# Patient Record
Sex: FEMALE | Race: OTHER | HISPANIC OR LATINO | URBAN - METROPOLITAN AREA
[De-identification: names, ages, dates, MRNs, and addresses within clinical notes are randomized per-mention and may not be internally consistent; named-entity substitution may affect disease eponyms.]

---

## 2018-09-25 ENCOUNTER — HOSPITAL ENCOUNTER (EMERGENCY)
Facility: HOSPITAL | Age: 28
Discharge: HOME | End: 2018-09-25
Attending: EMERGENCY MEDICINE | Admitting: EMERGENCY MEDICINE

## 2018-09-25 VITALS
DIASTOLIC BLOOD PRESSURE: 55 MMHG | WEIGHT: 144 LBS | OXYGEN SATURATION: 98 % | BODY MASS INDEX: 30.23 KG/M2 | TEMPERATURE: 99.2 F | RESPIRATION RATE: 17 BRPM | HEIGHT: 58 IN | SYSTOLIC BLOOD PRESSURE: 97 MMHG | HEART RATE: 69 BPM

## 2018-09-25 DIAGNOSIS — N39.0 UTI (URINARY TRACT INFECTION): ICD-10-CM

## 2018-09-25 DIAGNOSIS — J02.9 PHARYNGITIS: Primary | ICD-10-CM

## 2018-09-25 LAB
ALBUMIN SERPL BCP-MCNC: 4.1 G/DL (ref 3–5.2)
ALP SERPL-CCNC: 59 U/L (ref 43–122)
ALT SERPL W P-5'-P-CCNC: 31 U/L (ref 9–52)
ANION GAP SERPL CALCULATED.3IONS-SCNC: 9 MMOL/L (ref 5–14)
AST SERPL W P-5'-P-CCNC: 30 U/L (ref 14–36)
BACTERIA UR QL AUTO: ABNORMAL /HPF
BASOPHILS # BLD AUTO: 0 THOUSANDS/ΜL (ref 0–0.1)
BASOPHILS NFR BLD AUTO: 0 % (ref 0–1)
BILIRUB SERPL-MCNC: 0.6 MG/DL
BILIRUB UR QL STRIP: NEGATIVE
BUN SERPL-MCNC: 11 MG/DL (ref 5–25)
CALCIUM SERPL-MCNC: 8.8 MG/DL (ref 8.4–10.2)
CHLORIDE SERPL-SCNC: 106 MMOL/L (ref 97–108)
CK MB SERPL-MCNC: 0.8 NG/ML (ref 0–2.4)
CK MB SERPL-MCNC: <1 % (ref 0–2.5)
CK SERPL-CCNC: 199 U/L (ref 30–135)
CLARITY UR: ABNORMAL
CO2 SERPL-SCNC: 24 MMOL/L (ref 22–30)
COLOR UR: YELLOW
CREAT SERPL-MCNC: 0.58 MG/DL (ref 0.6–1.2)
EOSINOPHIL # BLD AUTO: 0.4 THOUSAND/ΜL (ref 0–0.4)
EOSINOPHIL NFR BLD AUTO: 4 % (ref 0–6)
ERYTHROCYTE [DISTWIDTH] IN BLOOD BY AUTOMATED COUNT: 14.3 %
EXT PREG TEST URINE: NORMAL
GFR SERPL CREATININE-BSD FRML MDRD: 126 ML/MIN/1.73SQ M
GLUCOSE SERPL-MCNC: 95 MG/DL (ref 70–99)
GLUCOSE UR STRIP-MCNC: NEGATIVE MG/DL
HCT VFR BLD AUTO: 37.6 % (ref 36–46)
HGB BLD-MCNC: 12.4 G/DL (ref 12–16)
HGB UR QL STRIP.AUTO: NEGATIVE
KETONES UR STRIP-MCNC: NEGATIVE MG/DL
LEUKOCYTE ESTERASE UR QL STRIP: 500
LIPASE SERPL-CCNC: 141 U/L (ref 23–300)
LYMPHOCYTES # BLD AUTO: 3.5 THOUSANDS/ΜL (ref 0.5–4)
LYMPHOCYTES NFR BLD AUTO: 36 % (ref 20–50)
MCH RBC QN AUTO: 29.6 PG (ref 26–34)
MCHC RBC AUTO-ENTMCNC: 33.1 G/DL (ref 31–36)
MCV RBC AUTO: 89 FL (ref 80–100)
MONOCYTES # BLD AUTO: 0.7 THOUSAND/ΜL (ref 0.2–0.9)
MONOCYTES NFR BLD AUTO: 8 % (ref 1–10)
NEUTROPHILS # BLD AUTO: 4.9 THOUSANDS/ΜL (ref 1.8–7.8)
NEUTS SEG NFR BLD AUTO: 52 % (ref 45–65)
NITRITE UR QL STRIP: NEGATIVE
NON-SQ EPI CELLS URNS QL MICRO: ABNORMAL /HPF
PH UR STRIP.AUTO: 7 [PH] (ref 4.5–8)
PLATELET # BLD AUTO: 301 THOUSANDS/UL (ref 150–450)
PMV BLD AUTO: 8.7 FL (ref 8.9–12.7)
POTASSIUM SERPL-SCNC: 3.7 MMOL/L (ref 3.6–5)
PROT SERPL-MCNC: 7.3 G/DL (ref 5.9–8.4)
PROT UR STRIP-MCNC: NEGATIVE MG/DL
RBC # BLD AUTO: 4.21 MILLION/UL (ref 4–5.2)
RBC #/AREA URNS AUTO: ABNORMAL /HPF
S PYO AG THROAT QL: NEGATIVE
SODIUM SERPL-SCNC: 139 MMOL/L (ref 137–147)
SP GR UR STRIP.AUTO: 1.01 (ref 1–1.04)
UROBILINOGEN UA: NEGATIVE MG/DL
WBC # BLD AUTO: 9.6 THOUSAND/UL (ref 4.5–11)
WBC #/AREA URNS AUTO: ABNORMAL /HPF

## 2018-09-25 PROCEDURE — 80053 COMPREHEN METABOLIC PANEL: CPT | Performed by: PHYSICIAN ASSISTANT

## 2018-09-25 PROCEDURE — 81003 URINALYSIS AUTO W/O SCOPE: CPT | Performed by: PHYSICIAN ASSISTANT

## 2018-09-25 PROCEDURE — 81025 URINE PREGNANCY TEST: CPT | Performed by: PHYSICIAN ASSISTANT

## 2018-09-25 PROCEDURE — 36415 COLL VENOUS BLD VENIPUNCTURE: CPT | Performed by: PHYSICIAN ASSISTANT

## 2018-09-25 PROCEDURE — 99283 EMERGENCY DEPT VISIT LOW MDM: CPT

## 2018-09-25 PROCEDURE — 96374 THER/PROPH/DIAG INJ IV PUSH: CPT

## 2018-09-25 PROCEDURE — 85025 COMPLETE CBC W/AUTO DIFF WBC: CPT | Performed by: PHYSICIAN ASSISTANT

## 2018-09-25 PROCEDURE — 81001 URINALYSIS AUTO W/SCOPE: CPT | Performed by: PHYSICIAN ASSISTANT

## 2018-09-25 PROCEDURE — 83690 ASSAY OF LIPASE: CPT | Performed by: PHYSICIAN ASSISTANT

## 2018-09-25 PROCEDURE — 87086 URINE CULTURE/COLONY COUNT: CPT | Performed by: PHYSICIAN ASSISTANT

## 2018-09-25 PROCEDURE — 82553 CREATINE MB FRACTION: CPT | Performed by: PHYSICIAN ASSISTANT

## 2018-09-25 PROCEDURE — 82550 ASSAY OF CK (CPK): CPT | Performed by: PHYSICIAN ASSISTANT

## 2018-09-25 PROCEDURE — 87070 CULTURE OTHR SPECIMN AEROBIC: CPT | Performed by: PHYSICIAN ASSISTANT

## 2018-09-25 PROCEDURE — 87430 STREP A AG IA: CPT | Performed by: PHYSICIAN ASSISTANT

## 2018-09-25 RX ORDER — ONDANSETRON 2 MG/ML
4 INJECTION INTRAMUSCULAR; INTRAVENOUS ONCE
Status: COMPLETED | OUTPATIENT
Start: 2018-09-25 | End: 2018-09-25

## 2018-09-25 RX ORDER — ONDANSETRON 2 MG/ML
INJECTION INTRAMUSCULAR; INTRAVENOUS
Status: COMPLETED
Start: 2018-09-25 | End: 2018-09-25

## 2018-09-25 RX ORDER — SODIUM CHLORIDE 9 MG/ML
250 INJECTION, SOLUTION INTRAVENOUS CONTINUOUS
Status: DISCONTINUED | OUTPATIENT
Start: 2018-09-25 | End: 2018-09-25 | Stop reason: HOSPADM

## 2018-09-25 RX ORDER — ACETAMINOPHEN 325 MG/1
650 TABLET ORAL ONCE
Status: COMPLETED | OUTPATIENT
Start: 2018-09-25 | End: 2018-09-25

## 2018-09-25 RX ORDER — CEPHALEXIN 500 MG/1
500 CAPSULE ORAL EVERY 8 HOURS SCHEDULED
Qty: 30 CAPSULE | Refills: 0 | Status: SHIPPED | OUTPATIENT
Start: 2018-09-25 | End: 2018-10-05

## 2018-09-25 RX ORDER — ACETAMINOPHEN 325 MG/1
TABLET ORAL
Status: COMPLETED
Start: 2018-09-25 | End: 2018-09-25

## 2018-09-25 RX ORDER — IBUPROFEN 600 MG/1
600 TABLET ORAL EVERY 6 HOURS PRN
Qty: 30 TABLET | Refills: 0 | Status: SHIPPED | OUTPATIENT
Start: 2018-09-25

## 2018-09-25 RX ADMIN — SODIUM CHLORIDE 250 ML/HR: 9 INJECTION, SOLUTION INTRAVENOUS at 18:17

## 2018-09-25 RX ADMIN — ONDANSETRON 4 MG: 2 INJECTION INTRAMUSCULAR; INTRAVENOUS at 17:42

## 2018-09-25 RX ADMIN — ONDANSETRON 4 MG: 2 INJECTION, SOLUTION INTRAMUSCULAR; INTRAVENOUS at 17:42

## 2018-09-25 RX ADMIN — ACETAMINOPHEN 650 MG: 325 TABLET ORAL at 17:41

## 2018-09-25 NOTE — DISCHARGE INSTRUCTIONS
Faringitis   LO QUE NECESITA SABER:   La faringitis o dolor de garganta es la inflamación de los tejidos y estructuras en galvin faringe (garganta)  La faringitis es generalmente causada por octavia bacteria  También podría ser causada por un resfriado o el virus de la gripe  Otras causas incluyen el fumar, las alergias o el reflujo estomacal    [facility]STRUCCIONES SOBRE EL [facility]:   Llame al 911 en ag de presentar lo siguiente:   · Usted tiene dificultad para respirar o tragar porque galvin garganta está inflamada o adolorida  Regrese a la elise de emergencias si:   · Usted está babeando porque le duele demasiado tragar  · Usted tiene fiebre por encima de 102? F (39?C) o le dura más de 3 días  · Usted está confundido  · Usted siente sabor a sheryl en galvin garganta  Pregúntele a galvin Eugenia Rumps vitaminas y minerales son adecuados para usted  · Galvin dolor de garganta empeora  · Usted tiene un bulto adolorido en galvin garganta que no se darrion después de 5 días  · Nohemi síntomas no mejoran después de 5 días  · Usted tiene preguntas o inquietudes acerca de galvin condición o cuidado  Medicamentos:  La faringitis viral desaparecerá por sí kristie sin necesidad de tratamiento  Galvin dolor de garganta empezará a mejorar dentro de 3 a 5 días en ambos casos de infecciones virales o bacteriales  Es posible que usted necesite alguno de los siguientes:  · Antibióticos  tratan las infecciones bacteriales  · AINEs (Analgésicos antiinflamatorios no esteroides) manny el ibuprofeno, ayudan a disminuir la inflamación, el dolor y la [place]  Los AINEs pueden causar sangrado estomacal o problemas renales en ciertas personas  Si usted jasmeet un medicamento anticoagulante, siempre pregúntele a galvin médico si los PETE son seguros para usted  Siempre tonya la etiqueta de blas medicamento y [place] instrucciones  · El acetaminofén  [place]iss[place] el dolor y baja la fiebre  Está disponible sin receta médica   Pregunte la cantidad y la frecuencia con que debe tomarlos  [address]  El acetaminofén puede causar daño en el hígado cuando no se jasmeet de forma correcta  · [place] john medicamentos manny se le haya indicado  Consulte con galvin médico si usted missael que galvin medicamento no le está ayudando o si presenta efectos secundarios  Infórmele si es alérgico a cualquier medicamento  Mantenga octavia lista actualizada de los [employer], las vitaminas y los productos herbales que jasmeet  Incluya los siguientes datos de los medicamentos: cantidad, frecuencia y motivo de administración  Traiga con usted la lista o los envases de la píldoras a john citas de seguimiento  Lleve la lista de los medicamentos con usted en ag de octavia emergencia  [place] de galvin síntomas:   · Tati gárgaras de agua con sal   Mezcle ¼ de cucharadita de sal en un vaso con 8 onzas de agua tibia y tati gárgaras  [place] podría ayudar a reducir la inflamación en galvin garganta  · [address] se le haya indicado  Es posible que usted necesite ingerir mas líquidos de lo habitual  Los líquidos pueden ayudar a aliviar galvin garganta y prevenir la deshidratación  Pregunte cuánto líquido debe tia cada día y cuáles líquidos son los más adecuados para usted  · Use un humidificador de vapor frío  para ayudar a humedecer el aire en galvin habitación y Willma Battiest galvin tos  · Alivie galvin garganta  con pastillas para la tos, hielo y alimentos blandos o helados de [place]  Prevenga la propagación de la faringitis:  Cúbrase la boca y [place] and Emil cuando tose o estornuda  No comparta alimentos o bebidas  Lávese las kailyn frecuentemente  Utilice agua y Primo  Si no tiene agua y jabón disponibles, entonces puede usar un alcohol para kailyn en gel  Acuda a john consultas de control con galvin médico según le indicaron  Anote john preguntas para que se acuerde de hacerlas nisreen john visitas     © 2017 [address] Information is for End User's use only and may not be sold, redistributed or otherwise used for commercial purposes  All illustrations and images included in CareNotes® are the copyrighted property of [employer]  or Manish Azevedo  Esta información es sólo para uso en educación  Galvin intención no es darle un consejo médico sobre enfermedades o tratamientos  Colsulte con galvin [place] farmacéutico antes de seguir cualquier régimen médico para saber si es seguro y efectivo para usted  Infección del tracto urinario en mujeres   LO QUE NECESITA SABER:   ¿Qué es octavia infección del tracto urinario (ITU)? La ITU ocurre cuando entran bacterias en el tracto urinario  Galvin tracto urinario incluye john riñones, uréteres, vejiga y [place]  La orina es producida en los riñones y fluye del uréter a la vejiga  La orina sale de la vejiga a través de la uretra  Octavia infección del tracto urinario es más común in [place] parte inferior de galvin tracto urinario que incluye galvin vejiga y uretra  ¿Qué aumenta mi riesgo de [employer] ITU? · Octavia sonda urinaria o autosondaje    · Un Daija Lexy,    · Problemas del tracto urinario, manny un estrechamiento, cálculos renales o incapacidad de vaciar la vejiga por completo    · Historial de ITU    · Relaciones sexuales    · Menopausia    · La diabetes u obesidad  ¿Cuáles son los signos y síntomas de [place] ITU? · Orinar con más frecuencia que de costumbre, perder orina o despertarse para orinar    · Dolor o ardor al orinar    · Dolor o presión en la parte inferior del abdomen     · Orina con mal olor    · [employer] en la orina  ¿Cómo se diagnostica octavia infección en el tracto urinario? Galvin médico le preguntará acerca de john signos y síntomas  Es posible que le presione el abdomen, los lados y la espalda para revisar si usted siente dolor  La orina se analizará para detectar bacterias que pueden estar causando la infección  Si tiene infecciones urinarias con frecuencia, puede necesitar más pruebas para encontrar la causa  ¿Cómo se trata octavia infección en el tracto urinario?    · Antibióticos ayudan a combatir octavia infección bacterial      · Medicamentos,  para disminuir el dolor y el ardor al orinar  También ayudarán a disminuir la sensación de necesitar orinar con frecuencia  Estos medicamentos harán que orine de color anaranjado o ny  ¿Qué puedo hacer para prevenir octavia ITU? · Vacíe la vejiga con frecuencia  Orine y vacíe la vejiga tan pronto manny usted sienta la necesidad  No retenga la orina por largos períodos de [place]  · Límpiese de adelante hacia atrás después de orinar o de tener octavia evacuación intestinal   [place] ayudará a evitar que los gérmenes entren en el tracto urinario a través de la uretra  · [address] se le haya indicado  Pregunte cuánto líquido debe tia cada día y cuáles líquidos son los más adecuados para usted  Es probable que usted necesite tia más líquidos de lo habitual para ayudar a deshacerse de la bacteria  No tome alcohol, cafeína ni jugos cítricos  Estos pueden irritar galvin vejiga y aumentar john síntomas  Galvin médico puede recomendarle el jugo de arándano para prevenir octavia infección Arturo Kind  · Orine después de [employer]  [place] puede ayudar a eliminar las bacterias que pasan nisreen el sexo  · No tome duchas vaginales ni use desodorantes femeninos  Estos pueden cambiar el equilibrio químico de la vagina  · Cambie las compresas o los tampones a menudo  [place] ayudará a evitar que los gérmenes entren en el tracto urinario  · Realice ejercicios para los músculos pélvicos con frecuencia  Los músculos pélvicos ayudan a empezar y parar de orinar  Los músculos pélvicos enedelia ayudan a vaciar la vejiga más fácilmente  Apriete estos músculos firmemente nisreen 5 segundos manny si estuviera tratando de retener el flujo de [facility]  Luego relaje por 5 segundos  Aumente gradualmente a 10 segundos  Joellen 3 series de 15 repeticiones al día o manny se le indique  ¿Cuándo pallavi buscar atención inmediata?    · Usted está orinando muy poco o nada en absoluto  · Usted tiene fiebre darlene con temblor y escalofríos  · Usted tiene dolor en el costado o en la espalda que MAY  ¿Cuándo pallavi comunicarme con mi médico?   · Usted tiene fiebre leve  · Usted no siente mejoría después de 2 días de tia los antibióticos  · Usted tiene síntomas nuevos, tales manny sheryl o pus en la orina  · Usted esta vomitando  · Usted tiene preguntas o inquietudes acerca de keller condición o cuidado  ACUERDOS SOBRE KELLER CUIDADO:   Usted tiene el derecho de ayudar a planear keller cuidado  Aprenda todo lo que pueda sobre keller condición y manny darle tratamiento  Discuta john opciones de tratamiento con john médicos para decidir el cuidado que usted desea recibir  Usted siempre tiene el derecho de rechazar el tratamiento  Esta información es sólo para uso en educación  Keller intención no es darle un consejo médico sobre enfermedades o tratamientos  Colsulte con keller Zelpha Roch farmacéutico antes de seguir cualquier régimen médico para saber si es seguro y efectivo para usted  © 2017 [address] Information is for End User's use only and may not be sold, redistributed or otherwise used for commercial purposes  All illustrations and images included in CareNotes® are the copyrighted property of A D A M , Inc  or Manish Azevedo

## 2018-09-25 NOTE — ED PROVIDER NOTES
History  Chief Complaint   Patient presents with    Generalized Body Aches     I have had fever, body aches for couple days now  I am also late for my period  History provided by:  Patient   used: Yes    Medical Problem   Location:  Pt with sore throat body aches abdomen pain suprapubic   Severity:  Mild  Duration:  3 days  Timing:  Constant  Progression:  Unchanged  Chronicity:  New  Associated symptoms: abdominal pain, congestion, cough, myalgias, rhinorrhea and sore throat    Associated symptoms: no chest pain, no diarrhea, no ear pain, no fatigue, no fever, no headaches, no loss of consciousness, no nausea, no rash, no shortness of breath, no vomiting and no wheezing        None       History reviewed  No pertinent past medical history  Past Surgical History:   Procedure Laterality Date    APPENDECTOMY         History reviewed  No pertinent family history  I have reviewed and agree with the history as documented  Social History   Substance Use Topics    Smoking status: Never Smoker    Smokeless tobacco: Never Used    Alcohol use No        Review of Systems   Constitutional: Negative  Negative for fatigue and fever  HENT: Positive for congestion, rhinorrhea and sore throat  Negative for ear pain  Respiratory: Positive for cough  Negative for shortness of breath and wheezing  Cardiovascular: Negative for chest pain  Gastrointestinal: Positive for abdominal pain  Negative for diarrhea, nausea and vomiting  Endocrine: Negative  Musculoskeletal: Positive for myalgias  Skin: Negative  Negative for rash  Allergic/Immunologic: Negative  Neurological: Negative  Negative for loss of consciousness and headaches  Hematological: Negative  Psychiatric/Behavioral: Negative  All other systems reviewed and are negative  Physical Exam  Physical Exam   Constitutional: She is oriented to person, place, and time   She appears well-developed and well-nourished  HENT:   Head: Normocephalic and atraumatic  Right Ear: External ear normal    Left Ear: External ear normal    Nose: Nose normal    Pharyngeal erythema    Eyes: Conjunctivae and EOM are normal  Pupils are equal, round, and reactive to light  Cardiovascular: Normal rate, regular rhythm and normal heart sounds  Pulmonary/Chest: Effort normal and breath sounds normal    Abdominal: Soft  Bowel sounds are normal    Suprapubic tenderness    Musculoskeletal: Normal range of motion  Lymphadenopathy:     She has cervical adenopathy  Neurological: She is alert and oriented to person, place, and time  Skin: Skin is warm  Psychiatric: She has a normal mood and affect  Her behavior is normal  Judgment and thought content normal    Nursing note and vitals reviewed        Vital Signs  ED Triage Vitals [09/25/18 1655]   Temperature Pulse Respirations Blood Pressure SpO2   99 2 °F (37 3 °C) 69 18 102/61 100 %      Temp Source Heart Rate Source Patient Position - Orthostatic VS BP Location FiO2 (%)   Oral Monitor Sitting Left arm --      Pain Score       --           Vitals:    09/25/18 1655 09/25/18 1817   BP: 102/61 97/55   Pulse: 69 69   Patient Position - Orthostatic VS: Sitting Lying       Visual Acuity      ED Medications  Medications   acetaminophen (TYLENOL) tablet 650 mg (650 mg Oral Given 9/25/18 1741)   ondansetron (ZOFRAN) injection 4 mg (4 mg Intravenous Given 9/25/18 1742)       Diagnostic Studies  Results Reviewed     Procedure Component Value Units Date/Time    CKMB [[id number]]  (Normal) Collected:  09/25/18 1733    Lab Status:  Final result Specimen:  Blood from Arm, Right Updated:  09/25/18 1807     CK-MB Index <1 0 %      CK-MB FRACTION 0 8 ng/mL     Rapid Strep A Screen Throat with Reflex to Culture, Pediatrics and Compromised Adults [[id number]]  (Normal) Collected:  09/25/18 1733    Lab Status:  Final result Specimen:  Throat from Throat Updated:  09/25/18 1752     Rapid Strep A Screen Negative    Throat culture [[id number]] Collected:  09/25/18 1733    Lab Status: In process Specimen:  Throat from Throat Updated:  09/25/18 1752    CK (with reflex to MB) [[id number]]  (Abnormal) Collected:  09/25/18 1733    Lab Status:  Final result Specimen:  Blood from Arm, Right Updated:  09/25/18 1750     Total  (H) U/L     Comprehensive metabolic panel [[id number]]  (Abnormal) Collected:  09/25/18 1733    Lab Status:  Final result Specimen:  Blood from Arm, Right Updated:  09/25/18 1750     Sodium 139 mmol/L      Potassium 3 7 mmol/L      Chloride 106 mmol/L      CO2 24 mmol/L      ANION GAP 9 mmol/L      BUN 11 mg/dL      Creatinine 0 58 (L) mg/dL      Glucose 95 mg/dL      Calcium 8 8 mg/dL      AST 30 U/L      ALT 31 U/L      Alkaline Phosphatase 59 U/L      Total Protein 7 3 g/dL      Albumin 4 1 g/dL      Total Bilirubin 0 60 mg/dL      eGFR 126 ml/min/1 73sq m     Narrative:         National Kidney Disease Education Program recommendations are as follows:  GFR calculation is accurate only with a steady state creatinine  Chronic Kidney disease less than 60 ml/min/1 73 sq  meters  Kidney failure less than 15 ml/min/1 73 sq  meters      Lipase [[id number]]  (Normal) Collected:  09/25/18 1733    Lab Status:  Final result Specimen:  Blood from Arm, Right Updated:  09/25/18 1750     Lipase 141 u/L     CBC and differential [[id number]]  (Abnormal) Collected:  09/25/18 1733    Lab Status:  Final result Specimen:  Blood from Arm, Right Updated:  09/25/18 1749     WBC 9 60 Thousand/uL      RBC 4 21 Million/uL      Hemoglobin 12 4 g/dL      Hematocrit 37 6 %      MCV 89 fL      MCH 29 6 pg      MCHC 33 1 g/dL      RDW 14 3 %      MPV 8 7 (L) fL      Platelets 772 Thousands/uL      Neutrophils Relative 52 %      Lymphocytes Relative 36 %      Monocytes Relative 8 %      Eosinophils Relative 4 %      Basophils Relative 0 %      Neutrophils Absolute 4 90 Thousands/µL      Lymphocytes Absolute 3 50 Thousands/µL Monocytes Absolute 0 70 Thousand/µL      Eosinophils Absolute 0 40 Thousand/µL      Basophils Absolute 0 00 Thousands/µL     Urine Microscopic [[id number]]  (Abnormal) Collected:  09/25/18 1716    Lab Status:  Final result Specimen:  Urine from Urine, Clean Catch Updated:  09/25/18 1736     RBC, UA 0-1 (A) /hpf      WBC, UA 10-20 (A) /hpf      Epithelial Cells Innumerable (A) /hpf      Bacteria, UA Innumerable (A) /hpf     Urine culture [[id number]] Collected:  09/25/18 1716    Lab Status:   In process Specimen:  Urine from Urine, Clean Catch Updated:  09/25/18 1736    UA w Reflex to Microscopic w Reflex to Culture [[id number]]  (Abnormal) Collected:  09/25/18 1716    Lab Status:  Final result Specimen:  Urine from Urine, Clean Catch Updated:  09/25/18 1732     Color, UA Yellow     Clarity, UA Cloudy (A)     Specific Gravity, UA 1 015     pH, UA 7 0     Leukocytes,  0 (A)     Nitrite, UA Negative     Protein, UA Negative mg/dl      Glucose, UA Negative mg/dl      Ketones, UA Negative mg/dl      Bilirubin, UA Negative     Blood, UA Negative     UROBILINOGEN UA Negative mg/dL     POCT pregnancy, urine [[id number]]  (Normal) Resulted:  09/25/18 1713    Lab Status:  Final result Updated:  09/25/18 1713     EXT PREG TEST UR (Ref: Negative) neg preg                 No orders to display              Procedures  Procedures       Phone Contacts  ED Phone Contact    ED Course                               MDM  CritCare Time    Disposition  Final diagnoses:   Pharyngitis   UTI (urinary tract infection)     Time reflects when diagnosis was documented in both MDM as applicable and the Disposition within this note     Time User Action Codes Description Comment    9/25/2018  6:13 PM Reynaldo Sargent Add [J02 9] Pharyngitis     9/25/2018  6:13 PM Emeli Tadeo Add [N39 0] UTI (urinary tract infection)       ED Disposition     ED Disposition Condition Comment    Discharge  [address] discharge to home/self care     Condition at discharge: Good        Follow-up Information     Follow up With Specialties Details Why [address], [address] Medicine Schedule an appointment as soon as possible for a visit  [address], [address] [place] [id number] [phone]          Discharge Medication List as of 9/25/2018  6:14 PM      START taking these medications    Details   cephalexin (KEFLEX) 500 mg capsule Take 1 capsule (500 mg total) by mouth every 8 (eight) hours for 10 days, Starting Tue 9/25/2018, Until Fri 10/5/2018, Print      ibuprofen (MOTRIN) 600 mg tablet Take 1 tablet (600 mg total) by mouth every 6 (six) hours as needed (pain), Starting Tue 9/25/2018, Print           No discharge procedures on file      ED Provider  Electronically Signed by           Sotero Sullivan PA-C  09/25/18 8247

## 2018-09-26 LAB — BACTERIA UR CULT: NORMAL

## 2018-09-27 LAB — BACTERIA THROAT CULT: NORMAL
